# Patient Record
Sex: FEMALE | Race: WHITE
[De-identification: names, ages, dates, MRNs, and addresses within clinical notes are randomized per-mention and may not be internally consistent; named-entity substitution may affect disease eponyms.]

---

## 2023-04-04 ENCOUNTER — HOSPITAL ENCOUNTER (OUTPATIENT)
Dept: HOSPITAL 76 - EMS | Age: 38
End: 2023-04-04
Payer: COMMERCIAL

## 2023-04-04 ENCOUNTER — HOSPITAL ENCOUNTER (EMERGENCY)
Dept: HOSPITAL 76 - ED | Age: 38
Discharge: HOME | End: 2023-04-04
Payer: COMMERCIAL

## 2023-04-04 VITALS — DIASTOLIC BLOOD PRESSURE: 74 MMHG | SYSTOLIC BLOOD PRESSURE: 121 MMHG
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
## 2023-04-04 DIAGNOSIS — S93.402A: ICD-10-CM

## 2023-04-04 DIAGNOSIS — M25.572: Primary | ICD-10-CM

## 2023-04-04 DIAGNOSIS — S92.212A: Primary | ICD-10-CM

## 2023-04-04 PROCEDURE — 99283 EMERGENCY DEPT VISIT LOW MDM: CPT

## 2023-04-04 PROCEDURE — 73630 X-RAY EXAM OF FOOT: CPT

## 2023-04-04 PROCEDURE — 73610 X-RAY EXAM OF ANKLE: CPT

## 2023-04-04 PROCEDURE — 29515 APPLICATION SHORT LEG SPLINT: CPT

## 2023-04-04 NOTE — ED PHYSICIAN DOCUMENTATION
PD HPI LOWER EXT INJURY





- Stated complaint


Stated Complaint: LT ANKLE PAIN





- Chief complaint


Chief Complaint: Trauma Ext





- History obtained from


History obtained from: Patient





- History of Present Illness


PD HPI LOW EXT INJURY LOCATION: Left, Ankle, Foot


Type of injury: Fall


Where injury occurred: Park


Timing - onset: Today


Pain level max: 7


Pain level now: 7


Improved by: Rest


Worsened by: Moving, Palpating





- Additional information


Additional information: 





38-year-old female presents to the emergency department after tripping and 

falling on her left ankle today.  Complains of pain and swelling.  Worse with 

movement, better with rest.  Has a history of sprained ankles in the past.  Has 

not taken anything for pain.  Denies any possibility of pregnancy.  No other 

injuries.





Review of Systems


Constitutional: denies: Fever, Chills


: denies: Now pregnant EGA


Skin: denies: Rash


Musculoskeletal: denies: Neck pain, Back pain


Neurologic: denies: Headache





PD PAST MEDICAL HISTORY





- Past Medical History


Past Medical History: No





- Present Medications


Home Medications: 


                                Ambulatory Orders











 Medication  Instructions  Recorded  Confirmed


 


HYDROcod/ACETAM 5/325 [Norco 5/325] 1 - 2 ea PO Q6H PRN #14 tablet 04/04/23 


 


Ondansetron Odt [Zofran] 4 mg TL Q6H PRN #10 tablet 04/04/23 














- Allergies


Allergies/Adverse Reactions: 


                                    Allergies











Allergy/AdvReac Type Severity Reaction Status Date / Time


 


fentanyl Allergy  Unknown Verified 04/04/23 18:22


 


iodine Allergy  Unknown Verified 04/04/23 18:22














- Living Situation


Living Situation: reports: With family


Living Arrangement: reports: At home





- Social History


Does the pt have substance abuse?: No





- Family History


Family history: reports: Non contributory





PD ED PE NORMAL





- Vitals


Vital signs reviewed: Yes





- General


General: Alert and oriented X 3, No acute distress





- HEENT


HEENT: Atraumatic, Moist mucous membranes





- Neck


Neck: Supple, no meningeal sign





- Derm


Derm: Warm and dry





- Extremities


Extremities: Other (Left lower extremity - Tenderness to palpation over the 

lateral malleolus of the left ankle.  Swelling present.  Also tenderness to 

palpation at the base of the fifth metatarsal.  Swelling present.  

Neurovascularly intact.  Otherwise normal examination of the left lower 

extremity.)





- Neuro


Neuro: Alert and oriented X 3





Results





- Vitals


Vitals: 


                               Vital Signs - 24 hr











  04/04/23 04/04/23





  18:20 21:46


 


Temperature 37 C 


 


Heart Rate 65 73


 


Respiratory 16 18





Rate  


 


Blood Pressure 135/86 H 121/74


 


O2 Saturation 100 100








                                     Oxygen











O2 Source                      Room air

















- Rads (name of study)


  ** Left ankle x-ray


Relevant Findings:: Final report received, See rad report





  ** Left foot x-ray


Relevant Findings:: Final report received, See rad report





Procedures





- Splint (location) - Minor


  ** Left ankle


Splint applied by: Physician, Tech


Type of splint: Short leg, Posterior


Other: Patient tolerated well, No complications, Neurovascular intact, Crutches 

provided





PD Medical Decision Making





- ED course


Complexity details: reviewed results, re-evaluated patient, considered 

differential, d/w patient


ED course: 





No acute findings on x-ray of the left ankle.  X-ray of the left foot shows a 

avulsion fracture of the cuboid.  Placed in a short leg posterior splint.  Pain 

well controlled with Vicodin.  Given crutches.  We will have her follow-up with 

her orthopedist of choice when she returns home to [place] at the end of the 

week.  Neurovascular intact.  Patient counseled regarding signs and symptoms for

 which I believe and urgent re-evaluation would be necessary. Patient with good 

understanding of and agreement to plan and is comfortable going home at this 

time





This document was made in part using voice recognition software. While efforts 

are made to proofread this document, sound alike and grammatical errors may occu

r.





Departure





- Departure


Disposition: 01 Home, Self Care


Clinical Impression: 


Left ankle sprain


Qualifiers:


 Encounter type: initial encounter Involved ligament of ankle: unspecified 

ligament Qualified Code(s): S93.402A - Sprain of unspecified ligament of left 

ankle, initial encounter





Cuboid fracture


Qualifiers:


 Encounter type: initial encounter Fracture type: closed Fracture alignment: 

displaced Laterality: left Qualified Code(s): S92.212A - Displaced fracture of 

cuboid bone of left foot, initial encounter for closed fracture





Condition: Good


Instructions:  ED Sprain Ankle W X Ray, ED Fx Foot


Follow-Up: 


your,doctor in 1 week [Other]


Prescriptions: 


HYDROcod/ACETAM 5/325 [Norco 5/325] 1 - 2 ea PO Q6H PRN #14 tablet


 PRN Reason: Pain


Ondansetron Odt [Zofran] 4 mg TL Q6H PRN #10 tablet


 PRN Reason: Nausea / Vomiting


Comments: 


Your prescription was sent to [facility] in [place].  Please  your 

prescriptions tomorrow.  Please follow-up with an orthopedist in [place] when

 you return home on Thursday. Stay in the splint until released by orthopedics. 

 You have an avulsion fracture of your cuboid bone in your foot.





I am prescribing a short course of narcotic pain medication for you. These are 

potentially dangerous and addictive medications that should be used carefully. 


These medications may constipate you. Take an over-the-counter stool softener 

(docusate) twice daily with plenty of water while taking these medications. If 

you go 24 hours without a bowel movement, take over-the-counter miralax, per 

package instructions.


Do not drink or drive while taking these medications. 


If you received narcotic or sedating medications while in the emergency 

department, do not drive for 24 hours.


Store this medication in a safe, secure place and out of reach of children. 


It is a violation of federal law to give or sell this medication to another 

person or to use in a manner other than prescribed.


The ED will not refill narcotic prescriptions, including prescriptions lost or 

stolen.


To dispose of unwanted medications:


1. [facility] at [address] in 

[place] has a medication drop box. They accept prescription medications (in 

pill form) Monday through Friday 9:00 a.m. to 5:00 p.m. 


2. [facility] [employer] accepts prescription medications (in

 pill form only) for disposal year round. Call [phone] for more 

information. 


3. Contact the [facility] for the next [facility] sponsored prescription 

drug collection event. [phone], [phone], or [phone] 

[phone];








L ankle xray - 





IMPRESSION: 


Nonspecific soft tissue edema over the lateral malleolus. No acute osseous 

abnormality. If there is 


clinical concern or persistent symptoms, additional imaging such as repeat 

radiographs or advanced 


imaging (e.g. CT, MRI) may be helpful for further evaluation. 





L foot xray - IMPRESSION: 





1. Small avulsion fracture at the lateral base of the cuboid. 


Discharge Date/Time: 04/04/23 22:07

## 2023-04-04 NOTE — XRAY REPORT
PROCEDURE:  Foot 3 View LT

 

INDICATIONS:  fall, 5th MT pain

 

TECHNIQUE:  3 views of the foot were acquired.  

 

COMPARISON:  None.

 

FINDINGS:  

 

Bones:  There is a small avulsion fracture laterally at the base of the cuboid. No other fracture or 
dislocation. No suspicious bony lesions.  

 

Soft tissues:  No suspicious soft tissue calcifications or masses.  

 

IMPRESSION:  

 

1. Small avulsion fracture at the lateral base of the cuboid.

 

 

 

Reviewed by: Jake Jaquez MD on 4/4/2023 9:20 PM PDT

Approved by: Jake Jaquez MD on 4/4/2023 9:20 PM PDT

 

 

Station ID:  [id number]

## 2023-04-04 NOTE — XRAY REPORT
PROCEDURE:  Ankle 3 View LT

 

INDICATIONS:  Trauma

 

TECHNIQUE:  3 views of the ankle were acquired.  

 

COMPARISON:  None.

 

FINDINGS:  

 

Bones:  No acute fractures or dislocations.  Ankle mortise is normally aligned.  No suspicious bony l
esions.  

 

Soft tissues:  Soft tissue edema surrounding the lateral malleolus.

 

IMPRESSION:  

Nonspecific soft tissue edema over the lateral malleolus. No acute osseous abnormality. If there is c
linical concern or persistent symptoms, additional imaging such as repeat radiographs or advanced mirian
ging (e.g. CT, MRI) may be helpful for further evaluation.

 

 

 

 

Reviewed by: Omkar Davies MD on 4/4/2023 8:11 PM PDT

Approved by: Omkar Davies MD on 4/4/2023 8:11 PM PDT

 

 

Station ID:  [id number]